# Patient Record
Sex: FEMALE | Race: BLACK OR AFRICAN AMERICAN
[De-identification: names, ages, dates, MRNs, and addresses within clinical notes are randomized per-mention and may not be internally consistent; named-entity substitution may affect disease eponyms.]

---

## 2020-09-21 ENCOUNTER — HOSPITAL ENCOUNTER (EMERGENCY)
Dept: HOSPITAL 12 - ER | Age: 65
Discharge: ANOTHER HOSPITAL | End: 2020-09-21
Payer: COMMERCIAL

## 2020-09-21 VITALS — BODY MASS INDEX: 18.46 KG/M2 | WEIGHT: 94 LBS | HEIGHT: 60 IN

## 2020-09-21 DIAGNOSIS — R06.2: ICD-10-CM

## 2020-09-21 DIAGNOSIS — R74.8: ICD-10-CM

## 2020-09-21 DIAGNOSIS — Z88.8: ICD-10-CM

## 2020-09-21 DIAGNOSIS — D72.810: ICD-10-CM

## 2020-09-21 DIAGNOSIS — Z74.01: ICD-10-CM

## 2020-09-21 DIAGNOSIS — Z88.6: ICD-10-CM

## 2020-09-21 DIAGNOSIS — D50.9: ICD-10-CM

## 2020-09-21 DIAGNOSIS — Z99.11: ICD-10-CM

## 2020-09-21 DIAGNOSIS — R07.9: Primary | ICD-10-CM

## 2020-09-21 DIAGNOSIS — Z20.828: ICD-10-CM

## 2020-09-21 DIAGNOSIS — J44.9: ICD-10-CM

## 2020-09-21 DIAGNOSIS — Z93.0: ICD-10-CM

## 2020-09-21 DIAGNOSIS — R51: ICD-10-CM

## 2020-09-21 DIAGNOSIS — R91.8: ICD-10-CM

## 2020-09-21 LAB
ALP SERPL-CCNC: 147 U/L (ref 50–136)
ALT SERPL W/O P-5'-P-CCNC: 75 U/L (ref 14–59)
AST SERPL-CCNC: 25 U/L (ref 15–37)
BASOPHILS # BLD AUTO: 0.1 K/UL (ref 0–8)
BASOPHILS NFR BLD AUTO: 1 % (ref 0–2)
BILIRUB DIRECT SERPL-MCNC: 0.2 MG/DL (ref 0–0.2)
BILIRUB SERPL-MCNC: 0.5 MG/DL (ref 0.2–1)
BUN SERPL-MCNC: 20 MG/DL (ref 7–18)
CHLORIDE SERPL-SCNC: 104 MMOL/L (ref 98–107)
CO2 SERPL-SCNC: 33 MMOL/L (ref 21–32)
CREAT SERPL-MCNC: 0.6 MG/DL (ref 0.6–1.3)
EOSINOPHIL # BLD AUTO: 0.4 K/UL (ref 0–0.7)
EOSINOPHIL NFR BLD AUTO: 6 % (ref 0–7)
EOSINOPHIL NFR BLD MANUAL: 3 % (ref 0–8)
GLUCOSE SERPL-MCNC: 85 MG/DL (ref 74–106)
HCT VFR BLD AUTO: 33.5 % (ref 31.2–41.9)
HGB BLD-MCNC: 10.1 G/DL (ref 10.9–14.3)
LYMPHOCYTES # BLD AUTO: 1.1 K/UL (ref 20–40)
LYMPHOCYTES NFR BLD AUTO: 19 % (ref 20.5–51.5)
LYMPHOCYTES NFR BLD MANUAL: 20 % (ref 20–40)
MCH RBC QN AUTO: 23.3 UUG (ref 24.7–32.8)
MCHC RBC AUTO-ENTMCNC: 30 G/DL (ref 32.3–35.6)
MCV RBC AUTO: 76.9 FL (ref 75.5–95.3)
MONOCYTES # BLD AUTO: 1 K/UL (ref 2–10)
MONOCYTES NFR BLD AUTO: 15.9 % (ref 0–11)
MONOCYTES NFR BLD MANUAL: 16 % (ref 2–10)
NEUTROPHILS # BLD AUTO: 3.5 K/UL (ref 1.8–8.9)
NEUTROPHILS NFR BLD AUTO: 58.1 % (ref 38.5–71.5)
NEUTS SEG NFR BLD MANUAL: 61 % (ref 42–75)
PLATELET # BLD AUTO: 285 K/UL (ref 179–408)
POTASSIUM SERPL-SCNC: 4.2 MMOL/L (ref 3.5–5.1)
RBC # BLD AUTO: 4.36 MIL/UL (ref 3.63–4.92)
WBC # BLD AUTO: 6 K/UL (ref 3.8–11.8)
WS STN SPEC: 7.4 G/DL (ref 6.4–8.2)

## 2020-09-21 PROCEDURE — 71045 X-RAY EXAM CHEST 1 VIEW: CPT

## 2020-09-21 PROCEDURE — 36415 COLL VENOUS BLD VENIPUNCTURE: CPT

## 2020-09-21 PROCEDURE — 70450 CT HEAD/BRAIN W/O DYE: CPT

## 2020-09-21 PROCEDURE — 84484 ASSAY OF TROPONIN QUANT: CPT

## 2020-09-21 PROCEDURE — 85025 COMPLETE CBC W/AUTO DIFF WBC: CPT

## 2020-09-21 PROCEDURE — 87040 BLOOD CULTURE FOR BACTERIA: CPT

## 2020-09-21 PROCEDURE — 87426 SARSCOV CORONAVIRUS AG IA: CPT

## 2020-09-21 PROCEDURE — 93005 ELECTROCARDIOGRAM TRACING: CPT

## 2020-09-21 PROCEDURE — 99285 EMERGENCY DEPT VISIT HI MDM: CPT

## 2020-09-21 PROCEDURE — 80076 HEPATIC FUNCTION PANEL: CPT

## 2020-09-21 PROCEDURE — 83605 ASSAY OF LACTIC ACID: CPT

## 2020-09-21 PROCEDURE — 83880 ASSAY OF NATRIURETIC PEPTIDE: CPT

## 2020-09-21 PROCEDURE — 96372 THER/PROPH/DIAG INJ SC/IM: CPT

## 2020-09-21 PROCEDURE — 80048 BASIC METABOLIC PNL TOTAL CA: CPT

## 2020-09-21 PROCEDURE — A4663 DIALYSIS BLOOD PRESSURE CUFF: HCPCS

## 2020-09-21 PROCEDURE — 96374 THER/PROPH/DIAG INJ IV PUSH: CPT

## 2020-09-21 NOTE — NUR
Placed a call to [address] [place] [address] ambulance and spoke to Ashley. Per rep. pt's belongings 
will be picked up and taken to transfering facility([facility]), as soon as 
ambulance availibity.

## 2020-09-21 NOTE — NUR
selene from [facility] called, information provided. selene will call back for 
details

## 2020-09-21 NOTE — NUR
Report given to transfering EMTs as well as RT. Chart copies sent w/ Pt. All 
belongings sent w/ pt.

## 2020-09-21 NOTE — NUR
Recieve a call from Zay, pt's Rn, regarding pt's belonging. Placed a call to 
[facility] and spoke to valdez. Was adviced to call [address] [place] [address] 
ambulance to arrange pt's belongings transfer.